# Patient Record
Sex: MALE | Race: WHITE | URBAN - METROPOLITAN AREA
[De-identification: names, ages, dates, MRNs, and addresses within clinical notes are randomized per-mention and may not be internally consistent; named-entity substitution may affect disease eponyms.]

---

## 2020-05-15 ENCOUNTER — OFFICE VISIT (OUTPATIENT)
Dept: URBAN - METROPOLITAN AREA CLINIC 71 | Facility: CLINIC | Age: 70
End: 2020-05-15
Payer: MEDICARE

## 2020-05-15 DIAGNOSIS — H17.89 OTHER CORNEAL SCARS AND OPACITIES: ICD-10-CM

## 2020-05-15 DIAGNOSIS — H01.009 BLEPHARITIS OF EYELID: ICD-10-CM

## 2020-05-15 PROCEDURE — 92014 COMPRE OPH EXAM EST PT 1/>: CPT | Performed by: OPHTHALMOLOGY

## 2020-05-15 ASSESSMENT — INTRAOCULAR PRESSURE
OS: 14
OD: 15

## 2020-05-15 ASSESSMENT — VISUAL ACUITY
OD: 20/50-2
OS: 20/25

## 2020-05-15 NOTE — IMPRESSION/PLAN
Impression: Age-related nuclear cataract, bilateral: H25.13.  Symptomatic OD Plan: proceed with preop for right eye

## 2020-05-15 NOTE — IMPRESSION/PLAN
Impression: Regular astigmatism, bilateral: H52.223. Plan: No glasses dispensed today due to symptomatic cataract OD.

## 2020-06-10 ENCOUNTER — OFFICE VISIT (OUTPATIENT)
Dept: URBAN - METROPOLITAN AREA CLINIC 71 | Facility: CLINIC | Age: 70
End: 2020-06-10
Payer: MEDICARE

## 2020-06-10 DIAGNOSIS — H52.223 REGULAR ASTIGMATISM, BILATERAL: ICD-10-CM

## 2020-06-10 DIAGNOSIS — H25.13 AGE-RELATED NUCLEAR CATARACT, BILATERAL: Primary | ICD-10-CM

## 2020-06-10 PROCEDURE — 92014 COMPRE OPH EXAM EST PT 1/>: CPT | Performed by: OPHTHALMOLOGY

## 2020-06-10 PROCEDURE — 92136 OPHTHALMIC BIOMETRY: CPT | Performed by: OPHTHALMOLOGY

## 2020-06-10 PROCEDURE — 92025 CPTRIZED CORNEAL TOPOGRAPHY: CPT | Performed by: OPHTHALMOLOGY

## 2020-06-10 RX ORDER — KETOROLAC TROMETHAMINE 5 MG/ML
0.5 % SOLUTION OPHTHALMIC
Qty: 1 | Refills: 1 | Status: INACTIVE
Start: 2020-06-10 | End: 2021-04-15

## 2020-06-10 ASSESSMENT — PACHYMETRY
OD: 25.40
OS: 3.63
OS: 24.99
OD: 3.29

## 2020-06-10 ASSESSMENT — INTRAOCULAR PRESSURE
OD: 14
OS: 14

## 2020-06-10 NOTE — IMPRESSION/PLAN
Impression: Regular astigmatism, bilateral: H52.223. Plan: PATIENT DOES NOT DESIRE SURGICAL CORRECTION OF ASTIGMATISM AND IS AWARE HE WILL NEED TO WEAR GLASSES MOST OF THE TIME.

## 2020-06-10 NOTE — IMPRESSION/PLAN
Impression: Age-related nuclear cataract, bilateral: H25.13. Plan: Educational materials provided: Cataract extraction/lens. Discussed treatment options with patient. Discussed diagnosis in detail with patient. Reassured patient of current condition and treatment. Discussed signs and symptoms of retinal detachment. New medication(s) Rx given today. Surgical treatment is required. Patient elects to have surgery. Lid scrubs and hygiene were explained. Pre op instructions given and understood. Post op instructions given and understood. Discussed risks of progression. PT HAS CHOSEN CARE PLUS WITH ORA OD DISTANCE IOL AND TRIMOXY WITH TOBRADEX (SAMPLE GIVEN) BID X 10 DAYS POSTOP. PT HAD LASIK AND IS AWARE OF AFFECTS OF OUTCOME OF CATARACT SURGERY. PT HAS HX OF ERM PEEL OD AND RETINAL DETACHMENT AND IS CLEARED BY DR Erick Welch. WILL RX KETOROLAC BID OD X 4 WKS POSTOP. SURGERY DEEMED NECESSARY TO IMPROVE VISION AND QUALITY OF LIFE.

## 2020-07-14 ENCOUNTER — SURGERY (OUTPATIENT)
Dept: URBAN - METROPOLITAN AREA SURGERY 45 | Facility: SURGERY | Age: 70
End: 2020-07-14
Payer: MEDICARE

## 2020-07-14 ENCOUNTER — SURGERY (OUTPATIENT)
Dept: URBAN - METROPOLITAN AREA SURGERY 44 | Facility: SURGERY | Age: 70
End: 2020-07-14
Payer: MEDICARE

## 2020-07-14 PROCEDURE — 66984 XCAPSL CTRC RMVL W/O ECP: CPT | Performed by: OPHTHALMOLOGY

## 2020-07-29 ENCOUNTER — POST-OPERATIVE VISIT (OUTPATIENT)
Dept: URBAN - METROPOLITAN AREA CLINIC 71 | Facility: CLINIC | Age: 70
End: 2020-07-29
Payer: MEDICARE

## 2020-07-29 PROCEDURE — 99024 POSTOP FOLLOW-UP VISIT: CPT | Performed by: OPHTHALMOLOGY

## 2020-07-29 ASSESSMENT — INTRAOCULAR PRESSURE
OD: 15
OS: 14

## 2020-07-29 NOTE — IMPRESSION/PLAN
Impression: S/P SA60AT 20.0 care plus far - trimoxi OD - 1 Day. Encounter for surgical aftercare following surgery on a sense organ  Z48.810. Plan: looks good- healing well.  ok to proceed with the PO REF in 4 weeks --Continue Cipro for 10 days --Continue Tobradex 10 days--Continue Ketorolac 0.5% for 4 weeks

## 2020-08-28 ENCOUNTER — POST-OPERATIVE VISIT (OUTPATIENT)
Dept: URBAN - METROPOLITAN AREA CLINIC 71 | Facility: CLINIC | Age: 70
End: 2020-08-28
Payer: MEDICARE

## 2020-08-28 DIAGNOSIS — Z48.810 ENCOUNTER FOR SURGICAL AFTERCARE FOLLOWING SURGERY ON A SENSE ORGAN: Primary | ICD-10-CM

## 2020-08-28 PROCEDURE — 99024 POSTOP FOLLOW-UP VISIT: CPT | Performed by: OPHTHALMOLOGY

## 2020-08-28 ASSESSMENT — VISUAL ACUITY
OS: 20/20
OD: 20/50

## 2020-08-28 NOTE — IMPRESSION/PLAN
Impression: S/P SA60AT 20.0 care plus far - trimoxi OD - 31 Days. Encounter for surgical aftercare following surgery on a sense organ  Z48.810. VA unable to be corrected to 20/20 likely due to ERM and ABMD. 
New glasses rx given to patient today.  Plan: Patient to follow up next avail with Dr. Melvin Solis for [facility] OCT

## 2020-11-06 ENCOUNTER — OFFICE VISIT (OUTPATIENT)
Dept: URBAN - METROPOLITAN AREA CLINIC 71 | Facility: CLINIC | Age: 70
End: 2020-11-06
Payer: MEDICARE

## 2020-11-06 DIAGNOSIS — H18.593 OTHER HEREDITARY CORNEAL DYSTROPHIES, BILATERAL: ICD-10-CM

## 2020-11-06 PROCEDURE — 92014 COMPRE OPH EXAM EST PT 1/>: CPT | Performed by: OPHTHALMOLOGY

## 2020-11-06 PROCEDURE — 92133 CPTRZD OPH DX IMG PST SGM ON: CPT | Performed by: OPHTHALMOLOGY

## 2020-11-06 ASSESSMENT — INTRAOCULAR PRESSURE
OD: 9
OS: 9

## 2020-11-06 NOTE — IMPRESSION/PLAN
Impression: Puckering of macula, right eye: H35.371. S/P membrane peel. OCT ordered today. Swelling in OD. Plan: Discussed with patient that ERM is cause of visual decrease. Swelling is still present. Not recommending injections of steroids at this time. Recommending patient to return to Dr. Benito Nelson to discuss treatment options.

## 2020-11-06 NOTE — IMPRESSION/PLAN
Impression: Other hereditary corneal dystrophies, bilateral: H18.593. ABMD Plan: Recommend heavy lubrication OU.

## 2020-11-06 NOTE — IMPRESSION/PLAN
Impression: Age-related nuclear cataract, left eye: H25.12. Plan: No treatment currently recommended. The patient will monitor vision changes and contact us with any decrease in vision.

## 2020-12-15 ENCOUNTER — OFFICE VISIT (OUTPATIENT)
Dept: URBAN - METROPOLITAN AREA CLINIC 68 | Facility: CLINIC | Age: 70
End: 2020-12-15
Payer: MEDICARE

## 2020-12-15 DIAGNOSIS — H35.371 PUCKERING OF MACULA, RIGHT EYE: ICD-10-CM

## 2020-12-15 DIAGNOSIS — H43.813 VITREOUS DEGENERATION, BILATERAL: ICD-10-CM

## 2020-12-15 PROCEDURE — 92134 CPTRZ OPH DX IMG PST SGM RTA: CPT | Performed by: OPHTHALMOLOGY

## 2020-12-15 PROCEDURE — 67028 INJECTION EYE DRUG: CPT | Performed by: OPHTHALMOLOGY

## 2020-12-15 PROCEDURE — 92014 COMPRE OPH EXAM EST PT 1/>: CPT | Performed by: OPHTHALMOLOGY

## 2020-12-15 ASSESSMENT — INTRAOCULAR PRESSURE
OD: 13
OS: 14

## 2020-12-15 NOTE — IMPRESSION/PLAN
Impression: h/o Retinal tear, OD. Status: Symptomatic.
s/p Laser Plan: Peripheral exam reveals good laser. No new tears. RDW. Observe.

## 2020-12-15 NOTE — IMPRESSION/PLAN
Impression: h/o ERM, OD Status: Symptomatic.
s/p PPV, MP, ICG, ILM,IVK 07/23/2019 ([facility]) Plan: Monitor.

## 2020-12-15 NOTE — IMPRESSION/PLAN
Impression: CME, OD. Plan: Exam and OCT reveal CME OD (445 microns). Discussed options of observation vs Triesence. RBA discussed. The patient elects Triesence. Thanks, Angella Nino Return in 1 month for follow up, OCT OU, re-eval CME

## 2021-02-11 ENCOUNTER — OFFICE VISIT (OUTPATIENT)
Dept: URBAN - METROPOLITAN AREA CLINIC 68 | Facility: CLINIC | Age: 71
End: 2021-02-11
Payer: MEDICARE

## 2021-02-11 PROCEDURE — 99213 OFFICE O/P EST LOW 20 MIN: CPT | Performed by: OPHTHALMOLOGY

## 2021-02-11 PROCEDURE — 92134 CPTRZ OPH DX IMG PST SGM RTA: CPT | Performed by: OPHTHALMOLOGY

## 2021-02-11 ASSESSMENT — INTRAOCULAR PRESSURE
OD: 10
OS: 10

## 2021-02-11 NOTE — IMPRESSION/PLAN
Impression: CME, OD. OCT - 419 thickening without cystic spaces or SRF OD; 283 no IRF/SRF Plan: Has residual thickening from prior ERM. No noticeable ERM on OCT. No noticeable symptomatic improvement from Triesence last month. RBA's of further steroid treatment d/w patient at length. Would rec obs. Patient agrees. 

12m OCT OU with us or Dr. Temo Shipley

## 2021-04-15 ENCOUNTER — OFFICE VISIT (OUTPATIENT)
Dept: URBAN - METROPOLITAN AREA CLINIC 13 | Facility: CLINIC | Age: 71
End: 2021-04-15
Payer: MEDICARE

## 2021-04-15 DIAGNOSIS — H35.81 RETINAL EDEMA: Primary | ICD-10-CM

## 2021-04-15 PROCEDURE — 92235 FLUORESCEIN ANGRPH MLTIFRAME: CPT | Performed by: OPHTHALMOLOGY

## 2021-04-15 PROCEDURE — 99213 OFFICE O/P EST LOW 20 MIN: CPT | Performed by: OPHTHALMOLOGY

## 2021-04-15 PROCEDURE — 92134 CPTRZ OPH DX IMG PST SGM RTA: CPT | Performed by: OPHTHALMOLOGY

## 2021-04-15 ASSESSMENT — INTRAOCULAR PRESSURE
OD: 15
OS: 14

## 2021-04-15 NOTE — IMPRESSION/PLAN
Impression: retinal edema OD Plan: Pt is here for 2nd opinion. Has residual thickening from prior ERM. No noticeable ERM on OCT. FA today shows no CME/leakage/staining or delayed perfusion OU. No noticeable symptomatic improvement from Triesence previously. RBA's of further steroid treatment d/w patient at length. Would rec obs. Consider checking formal visual field with primary eye care provider.  
12m OCT OU with us or Dr. Ashanti Escobar

## 2021-05-03 ENCOUNTER — OFFICE VISIT (OUTPATIENT)
Dept: URBAN - METROPOLITAN AREA CLINIC 71 | Facility: CLINIC | Age: 71
End: 2021-05-03
Payer: MEDICARE

## 2021-05-03 DIAGNOSIS — Z96.1 PRESENCE OF INTRAOCULAR LENS: ICD-10-CM

## 2021-05-03 DIAGNOSIS — H04.123 DRY EYE SYNDROME OF BILATERAL LACRIMAL GLANDS: ICD-10-CM

## 2021-05-03 DIAGNOSIS — H33.311 HORSESHOE TEAR OF RETINA WITHOUT DETACHMENT, RIGHT EYE: ICD-10-CM

## 2021-05-03 DIAGNOSIS — H25.12 AGE-RELATED NUCLEAR CATARACT, LEFT EYE: ICD-10-CM

## 2021-05-03 PROCEDURE — 99214 OFFICE O/P EST MOD 30 MIN: CPT | Performed by: OPHTHALMOLOGY

## 2021-05-03 ASSESSMENT — INTRAOCULAR PRESSURE
OD: 11
OS: 13

## 2021-05-03 NOTE — IMPRESSION/PLAN
Impression: Puckering of macula, right eye: H35.371 OD.  Plan: OCT ordered today- no significant change depsite multiple interventions by retinal specilast

## 2021-05-03 NOTE — IMPRESSION/PLAN
Impression: Horseshoe tear of retina without detachment, right eye: H33.311 OD. Plan: Observe.  treated by Karuna Parker

## 2021-05-03 NOTE — IMPRESSION/PLAN
Impression: Irregular astigmatism, bilateral: H52.213. Plan: Dysfunction lens index is very poor, nichole shows irregular k with regular and high order aberration's. this is a combo of previous lasik and ABMD recommend HCL vs scleral to try to improve with Dr Napoleon Mckinnon. Explained that it might not be the perfect vision to 20/20.

## 2021-06-24 ENCOUNTER — OFFICE VISIT (OUTPATIENT)
Dept: URBAN - METROPOLITAN AREA CLINIC 75 | Facility: CLINIC | Age: 71
End: 2021-06-24
Payer: COMMERCIAL

## 2021-06-24 PROCEDURE — 92310 CONTACT LENS FITTING OU: CPT | Performed by: OPTOMETRIST

## 2021-06-24 PROCEDURE — 92004 COMPRE OPH EXAM NEW PT 1/>: CPT | Performed by: OPTOMETRIST

## 2021-06-24 ASSESSMENT — INTRAOCULAR PRESSURE
OS: 12
OD: 11

## 2021-06-24 NOTE — IMPRESSION/PLAN
Impression: Irregular astigmatism, bilateral: H52.213- Scleral Lens Initial Fit OD only. $875 OD, Lens is Medically Necessary. Plan: OD: Adequate Fit, needs to be steepen in periphery, increase SAG a little bit, toric markers: 5:30 & 11:30, non-haptic needs to be steepen by 3 quarters of diopter. SA, PWR:-2.00, EFRAIN:15.6, EDG:+75 -75, CT: 0.23 OR: -0.50 sph  20/20-2 Manifest OS: +1.75 -1.50 x014   20/25 Sag [id number] MP std L -75 LZ+75/-150
no dots

## 2021-07-19 ENCOUNTER — OFFICE VISIT (OUTPATIENT)
Dept: URBAN - METROPOLITAN AREA CLINIC 75 | Facility: CLINIC | Age: 71
End: 2021-07-19

## 2021-07-19 PROCEDURE — 92310 CONTACT LENS FITTING OU: CPT | Performed by: OPTOMETRIST

## 2021-07-19 ASSESSMENT — INTRAOCULAR PRESSURE
OS: 10
OD: 9

## 2021-07-19 NOTE — IMPRESSION/PLAN
Impression: Irregular astigmatism, bilateral: H52.213- Scleral Lens Fit OD only & Initial fit OS Lens is Medically Necessary OD. Pt elects to try lens in OS instead of glasses. Pt will consider if he wants a left lens or not. Plan: OD Fit: central clearance, mid perif clearance, toric markers @ 6 & 12.
OR: -2.50 sph 20/20-2 Will reorder lens with correct power & call pt when lens arrives OD. OS: full clearing central and mid perif. good edge clearance, steepen haptic meridian, Toric Haptic @ 4 & 10, running thin mid perif 50-75 microns. Non-haptic meridian is where it needs to be. OS: SA, PWR:-3.00, EFRAIN: 15.6, EDG: +075, -75, CT: 0.22 OR: +0.50 sph 20/20 Explained it would be $750 for lens OS.

## 2021-09-20 ENCOUNTER — OFFICE VISIT (OUTPATIENT)
Dept: URBAN - METROPOLITAN AREA CLINIC 75 | Facility: CLINIC | Age: 71
End: 2021-09-20

## 2021-09-20 PROCEDURE — 92310 CONTACT LENS FITTING OU: CPT | Performed by: OPTOMETRIST

## 2021-09-20 ASSESSMENT — VISUAL ACUITY: OS: 20/25

## 2021-09-20 NOTE — IMPRESSION/PLAN
Impression: Irregular astigmatism, bilateral: H52.213- Scleral Lens Dispense OD. Discussed with patient the vision may not be perfect due to retinal issues in the past. Plan: OD Fit: full coverage, edge is clear (alined), toric markers @ 5 & 11
OR: [place] sph 20/25 Astigmatism route OR: +0.25sph

## 2021-10-29 ENCOUNTER — OFFICE VISIT (OUTPATIENT)
Dept: URBAN - METROPOLITAN AREA CLINIC 75 | Facility: CLINIC | Age: 71
End: 2021-10-29

## 2021-10-29 DIAGNOSIS — H52.4 PRESBYOPIA: ICD-10-CM

## 2021-10-29 DIAGNOSIS — H52.213 IRREGULAR ASTIGMATISM, BILATERAL: Primary | ICD-10-CM

## 2021-10-29 PROCEDURE — 92310 CONTACT LENS FITTING OU: CPT | Performed by: OPTOMETRIST

## 2021-10-29 NOTE — IMPRESSION/PLAN
Impression: Irregular astigmatism, bilateral: H52.213- Scleral Lens 1 month R/C OD. Discussed with patient the vision may not be perfect due to retinal issues in the past. Plan: OD Fit: full coverage, edge is clear (alined), toric markers @ 5 & 11
OR: [place] sph

## 2022-04-13 ENCOUNTER — OFFICE VISIT (OUTPATIENT)
Dept: URBAN - METROPOLITAN AREA CLINIC 71 | Facility: CLINIC | Age: 72
End: 2022-04-13
Payer: MEDICARE

## 2022-04-13 DIAGNOSIS — H04.123 DRY EYE SYNDROME OF BILATERAL LACRIMAL GLANDS: ICD-10-CM

## 2022-04-13 DIAGNOSIS — H00.14 CHALAZION LEFT UPPER EYELID: Primary | ICD-10-CM

## 2022-04-13 DIAGNOSIS — Z96.1 PRESENCE OF INTRAOCULAR LENS: ICD-10-CM

## 2022-04-13 DIAGNOSIS — H25.12 AGE-RELATED NUCLEAR CATARACT, LEFT EYE: ICD-10-CM

## 2022-04-13 PROCEDURE — 99212 OFFICE O/P EST SF 10 MIN: CPT | Performed by: OPHTHALMOLOGY

## 2022-04-13 RX ORDER — NEOMYCIN SULFATE, POLYMYXIN B SULFATE AND DEXAMETHASONE 3.5; 10000; 1 MG/ML; [USP'U]/ML; MG/ML
SUSPENSION/ DROPS OPHTHALMIC
Qty: 5 | Refills: 0 | Status: ACTIVE
Start: 2022-04-13

## 2022-04-13 NOTE — IMPRESSION/PLAN
Impression: Chalazion left upper eyelid: H00.14. Plan: Discussed. Recommend using a warm compress for the 10-15 mins, lids scrubs before bed. 

Start alena-poly-dex TID for 1 week